# Patient Record
Sex: FEMALE | Race: WHITE | Employment: FULL TIME | URBAN - METROPOLITAN AREA
[De-identification: names, ages, dates, MRNs, and addresses within clinical notes are randomized per-mention and may not be internally consistent; named-entity substitution may affect disease eponyms.]

---

## 2018-08-20 ENCOUNTER — APPOINTMENT (OUTPATIENT)
Dept: GENERAL RADIOLOGY | Age: 38
End: 2018-08-20
Payer: COMMERCIAL

## 2018-08-20 ENCOUNTER — HOSPITAL ENCOUNTER (EMERGENCY)
Age: 38
Discharge: HOME | End: 2018-08-20
Attending: EMERGENCY MEDICINE
Payer: COMMERCIAL

## 2018-08-20 VITALS
SYSTOLIC BLOOD PRESSURE: 122 MMHG | HEART RATE: 98 BPM | OXYGEN SATURATION: 97 % | WEIGHT: 225 LBS | RESPIRATION RATE: 20 BRPM | HEIGHT: 70 IN | BODY MASS INDEX: 32.21 KG/M2 | TEMPERATURE: 98.2 F | DIASTOLIC BLOOD PRESSURE: 88 MMHG

## 2018-08-20 DIAGNOSIS — M79.641 RIGHT HAND PAIN: ICD-10-CM
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
## 2018-08-20 DIAGNOSIS — M79.645 FINGER PAIN, LEFT: ICD-10-CM
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
## 2018-08-20 DIAGNOSIS — S51.852A DOG BITE OF LEFT FOREARM, INITIAL ENCOUNTER: ICD-10-CM

## 2018-08-20 DIAGNOSIS — S51.851A DOG BITE OF RIGHT FOREARM, INITIAL ENCOUNTER: ICD-10-CM
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
## 2018-08-20 DIAGNOSIS — S61.451A DOG BITE OF RIGHT HAND, INITIAL ENCOUNTER: ICD-10-CM

## 2018-08-20 PROCEDURE — 4500000023 HC ED LEVEL 3 PROCEDURE

## 2018-08-20 PROCEDURE — 73130 X-RAY EXAM OF HAND: CPT

## 2018-08-20 PROCEDURE — 6370000000 HC RX 637 (ALT 250 FOR IP): Performed by: NURSE PRACTITIONER

## 2018-08-20 PROCEDURE — 73090 X-RAY EXAM OF FOREARM: CPT

## 2018-08-20 PROCEDURE — 99283 EMERGENCY DEPT VISIT LOW MDM: CPT

## 2018-08-20 RX ORDER — HYDROCODONE BITARTRATE AND ACETAMINOPHEN 5; 325 MG/1; MG/1
1-2 TABLET ORAL EVERY 6 HOURS PRN
Qty: 6 TABLET | Refills: 0 | Status: SHIPPED | OUTPATIENT
Start: 2018-08-20 | End: 2018-08-22

## 2018-08-20 RX ORDER — AMOXICILLIN AND CLAVULANATE POTASSIUM 875; 125 MG/1; MG/1
1 TABLET, FILM COATED ORAL 2 TIMES DAILY
Qty: 20 TABLET | Refills: 0 | Status: SHIPPED | OUTPATIENT
Start: 2018-08-20 | End: 2018-08-30

## 2018-08-20 RX ORDER — AMOXICILLIN AND CLAVULANATE POTASSIUM 875; 125 MG/1; MG/1
1 TABLET, FILM COATED ORAL ONCE
Status: COMPLETED | OUTPATIENT
Start: 2018-08-20 | End: 2018-08-20

## 2018-08-20 RX ORDER — HYDROCODONE BITARTRATE AND ACETAMINOPHEN 5; 325 MG/1; MG/1
1 TABLET ORAL ONCE
Status: COMPLETED | OUTPATIENT
Start: 2018-08-20 | End: 2018-08-20

## 2018-08-20 RX ADMIN — HYDROCODONE BITARTRATE AND ACETAMINOPHEN 1 TABLET: 5; 325 TABLET ORAL at 22:58

## 2018-08-20 RX ADMIN — AMOXICILLIN AND CLAVULANATE POTASSIUM 1 TABLET: 875; 125 TABLET, FILM COATED ORAL at 22:57

## 2018-08-20 ASSESSMENT — PAIN SCALES - GENERAL
PAINLEVEL_OUTOF10: 8
PAINLEVEL_OUTOF10: 3
PAINLEVEL_OUTOF10: 10

## 2018-08-20 ASSESSMENT — PAIN DESCRIPTION - LOCATION: LOCATION: ARM;HAND

## 2018-08-20 ASSESSMENT — PAIN DESCRIPTION - ORIENTATION: ORIENTATION: LEFT;RIGHT

## 2018-08-20 ASSESSMENT — PAIN DESCRIPTION - PAIN TYPE: TYPE: ACUTE PAIN

## 2018-08-21 NOTE — ED NOTES
Pt right hand/ forearm applied with Adaptic/gauze/artie wrap.      Teodoro Stephenson LPN  21/78/71 [id number]

## 2018-08-21 NOTE — ED PROVIDER NOTES
Normal rate and intact distal pulses. Pulmonary/Chest: Effort normal.   Musculoskeletal: She exhibits tenderness. Hands:  Neurological: She is alert and oriented to person, place, and time. Skin: Skin is warm. She is not diaphoretic. Multiple abrasions to left finger   Psychiatric: She has a normal mood and affect. Her behavior is normal. Judgment and thought content normal.       DIAGNOSTIC RESULTS   LABS:    Labs Reviewed - No data to display    All other labs were within normal range or not returned as of this dictation. EKG: All EKG's are interpreted by the Emergency Department Physician who either signs or Co-signs this chart in the absence of a cardiologist.  Please see their note for interpretation of EKG. RADIOLOGY:   Non-plain film images such as CT, Ultrasound and MRI are read by the radiologist. Plain radiographic images are visualized and preliminarily interpreted by the  ED Provider with the below findings:        Interpretation per the Radiologist below, if available at the time of this note:    XR HAND RIGHT (MIN 3 VIEWS)   Final Result   No radiopaque foreign body. XR RADIUS ULNA RIGHT (2 VIEWS)   Final Result   No radiopaque foreign body. No results found. PROCEDURES   Unless otherwise noted below, none     Procedures Laceration Repair Procedure Note    Indication: Laceration    Procedure: The patient was placed in the appropriate position and anesthesia around the lacerations of the right forearm were obtained by infiltration using 1% Lidocaine without epinephrine. The area was then cleansed using hibiclens. The laceration was closed with 4-0 Ethilon using interrupted sutures. A second laceration of the right forearm was closed with 4-0 Ethilon using interrupted sutures. The wound area was then dressed with a sterile dressing. Total repaired wound length: 1.5 cm.      Other Items: Suture count: 4    The patient tolerated the procedure well.    Complications: None      CRITICAL CARE TIME   N/A    CONSULTS:  None      EMERGENCY DEPARTMENT COURSE and DIFFERENTIAL DIAGNOSIS/MDM:   Vitals:    Vitals:    08/20/18 2208   BP: 122/88   Pulse: 98   Resp: 20   Temp: 98.2 °F (36.8 °C)   TempSrc: Oral   SpO2: 97%   Weight: 225 lb (102.1 kg)   Height: 5' 10\" (1.778 m)       Patient was given the following medications:  Medications   amoxicillin-clavulanate (AUGMENTIN) 875-125 MG per tablet 1 tablet (1 tablet Oral Given 8/20/18 2257)   HYDROcodone-acetaminophen (NORCO) 5-325 MG per tablet 1 tablet (1 tablet Oral Given 8/20/18 2258)       Patient is alert and oriented x4. Multiple lacerations/puncture wounds to right forearm, left forearm and to the top of the right hand and top of left hand. Left hand with tenderness over the 5th digit with no obvious deformity. There is mild bruising present. Patient has FROM of the digit with flexion and extension. Right hand is also tender to palpation, but no obvious deformity noted. Distal pulses and neurovascular status is intact. Differentials: dog bite, laceration, puncture wounds  Augmentin given in the ED along with Tou[place]  Patient does not want an xray of her right hand or left fingers. Did ask for aluminum splint for finger discomfort. Diagnosis: dog bites, puncture wounds, right hand pain, pain to the left hand 5th digit  Patient will be discharged with Augmentin and Norco for pain. Patient to follow up with PCP this week for a wound check. Splint placed to digit injured for comfort. The patient tolerated their visit well. They were seen and evaluated by the attending physician who agreed with the assessment and plan. The patient and / or the family were informed of the results of any tests, a time was given to answer questions, a plan was proposed and they agreed with plan. FINAL IMPRESSION      1. Dog bite of multiple sites    2. Finger pain, left    3. Right hand pain    4.  Dog bite of right forearm, initial encounter    5. Dog bite of left forearm, initial encounter    6. Dog bite of right hand, initial encounter          DISPOSITION/PLAN   DISPOSITION        PATIENT REFERRED TO:  Inez Osman  [phone]  Schedule an appointment as soon as possible for a visit in 3 days  For recheck, For wound re-check    [facility]  ED  [address]  Go to   For new or worsening symptoms, For suture removal in 10 days      DISCHARGE MEDICATIONS:  Discharge Medication List as of 8/20/2018 11:34 PM      START taking these medications    Details   HYDROcodone-acetaminophen (NORCO) 5-325 MG per tablet Take 1-2 tablets by mouth every 6 hours as needed for Pain for up to 2 days.  Sedation precautions please., Disp-6 tablet, R-0Print      amoxicillin-clavulanate (AUGMENTIN) 875-125 MG per tablet Take 1 tablet by mouth 2 times daily for 10 days, Disp-20 tablet, R-0Print             DISCONTINUED MEDICATIONS:  Discharge Medication List as of 8/20/2018 11:34 PM                 (Please note that portions of this note were completed with a voice recognition program.  Efforts were made to edit the dictations but occasionally words are mis-transcribed.)    UBALDO Cervantes - CNP (electronically signed)           UBALDO Cervantes - CNP  08/20/18 196 UBALDO Ken - CNP  08/27/18 [id number] GORDON Byers [facility], UBALDO - JOSE  08/27/18 [id number] [facility]ORDON [facility] UBAL[facility] - JOSE  08/27/18 [id number]

## 2018-08-27 ASSESSMENT — ENCOUNTER SYMPTOMS
VOMITING: 0
ALLERGIC/IMMUNOLOGIC NEGATIVE: 1
NAUSEA: 0
SHORTNESS OF BREATH: 0
EYES NEGATIVE: 1